# Patient Record
Sex: MALE | Race: WHITE | HISPANIC OR LATINO | URBAN - METROPOLITAN AREA
[De-identification: names, ages, dates, MRNs, and addresses within clinical notes are randomized per-mention and may not be internally consistent; named-entity substitution may affect disease eponyms.]

---

## 2020-03-13 ENCOUNTER — EMERGENCY (EMERGENCY)
Facility: HOSPITAL | Age: 50
LOS: 1 days | Discharge: HOME | End: 2020-03-13
Admitting: EMERGENCY MEDICINE
Payer: SELF-PAY

## 2020-03-13 VITALS
WEIGHT: 197.98 LBS | RESPIRATION RATE: 18 BRPM | DIASTOLIC BLOOD PRESSURE: 95 MMHG | OXYGEN SATURATION: 96 % | TEMPERATURE: 98 F | HEIGHT: 70 IN | SYSTOLIC BLOOD PRESSURE: 141 MMHG | HEART RATE: 85 BPM

## 2020-03-13 PROCEDURE — 99283 EMERGENCY DEPT VISIT LOW MDM: CPT

## 2020-03-13 RX ORDER — CYCLOBENZAPRINE HYDROCHLORIDE 10 MG/1
10 TABLET, FILM COATED ORAL ONCE
Refills: 0 | Status: COMPLETED | OUTPATIENT
Start: 2020-03-13 | End: 2020-03-13

## 2020-03-13 RX ORDER — IBUPROFEN 200 MG
600 TABLET ORAL ONCE
Refills: 0 | Status: COMPLETED | OUTPATIENT
Start: 2020-03-13 | End: 2020-03-13

## 2020-03-13 RX ORDER — CYCLOBENZAPRINE HYDROCHLORIDE 10 MG/1
1 TABLET, FILM COATED ORAL
Qty: 15 | Refills: 0
Start: 2020-03-13

## 2020-03-13 RX ADMIN — CYCLOBENZAPRINE HYDROCHLORIDE 10 MILLIGRAM(S): 10 TABLET, FILM COATED ORAL at 11:30

## 2020-03-13 RX ADMIN — Medication 600 MILLIGRAM(S): at 11:30

## 2020-03-13 RX ADMIN — Medication 600 MILLIGRAM(S): at 12:30

## 2020-03-13 NOTE — ED PROVIDER NOTE - PATIENT PORTAL LINK FT
You can access the FollowMyHealth Patient Portal offered by [facility] by registering at the following website: [email or web address]. By joining [facility]’s FollowMyHealth portal, you will also be able to view your health information using other applications (apps) compatible with our system.

## 2020-03-13 NOTE — ED PROVIDER NOTE - NSFOLLOWUPINSTRUCTIONS_ED_ALL_ED_FT
Take NSAIDs and tylenol for pain as well as flexeril as prescribed.  Follow up with primary doctor within one week. Return to ED for worsening pain, neck stiffness, numbness or weakness in arms, shortness of breath, chest pain    Muscle Strain  A muscle strain is an injury that happens when a muscle is stretched longer than normal. This can happen during a fall, sports, or lifting. This can tear some muscle fibers. Usually, recovery from muscle strain takes 1–2 weeks. Complete healing normally takes 5–6 weeks.  This condition is first treated with PRICE therapy. This involves:  Protecting your muscle from being injured again.Resting your injured muscle.Icing your injured muscle.Applying pressure (compression) to your injured muscle. This may be done with a splint or elastic bandage.Raising (elevating) your injured muscle.Your doctor may also recommend medicine for pain.  Follow these instructions at home:  If you have a splint:     Wear the splint as told by your doctor. Take it off only as told by your doctor.Loosen the splint if your fingers or toes tingle, get numb, or turn cold and blue.Keep the splint clean.If the splint is not waterproof:  Do not let it get wet.Cover it with a watertight covering when you take a bath or a shower.Managing pain, stiffness, and swelling        If directed, put ice on your injured area.  If you have a removable splint, take it off as told by your doctor.Put ice in a plastic bag.Place a towel between your skin and the bag.Leave the ice on for 20 minutes, 2–3 times a day.Move your fingers or toes often. This helps to avoid stiffness and lessen swelling.Raise your injured area above the level of your heart while you are sitting or lying down.Wear an elastic bandage as told by your doctor. Make sure it is not too tight.General instructions     Take over-the-counter and prescription medicines only as told by your doctor.Limit your activity. Rest your injured muscle as told by your doctor. Your doctor may say that gentle movements are okay.If physical therapy was prescribed, do exercises as told by your doctor.Do not put pressure on any part of the splint until it is fully hardened. This may take many hours.Do not use any products that contain nicotine or tobacco, such as cigarettes and e-cigarettes. These can delay bone healing. If you need help quitting, ask your doctor.Warm up before you exercise. This helps to prevent more muscle strains.Ask your doctor when it is safe to drive if you have a splint.Keep all follow-up visits as told by your doctor. This is important.Contact a doctor if:  You have more pain or swelling in your injured area.Get help right away if:  You have any of these problems in your injured area:  You have numbness.You have tingling.You lose a lot of strength.Summary  A muscle strain is an injury that happens when a muscle is stretched longer than normal.This condition is first treated with PRICE therapy. This includes protecting, resting, icing, adding pressure, and raising your injury.Limit your activity. Rest your injured muscle as told by your doctor. Your doctor may say that gentle movements are okay.Warm up before you exercise. This helps to prevent more muscle strains.This information is not intended to replace advice given to you by your health care provider. Make sure you discuss any questions you have with your health care provider.    Document Released: 09/26/2009 Document Revised: 01/24/2018 Document Reviewed: 01/24/2018  [employer] Interactive Patient Education © 2020 [employer]

## 2020-03-13 NOTE — ED PROVIDER NOTE - PHYSICAL EXAMINATION
Vitals reviewed  Gen: well appearing, nad, speaking in full sentences  Skin: wwp   HEENT: ncat, eomi, mmm  CV: rrr, no audible m/r/g  Resp: ctab, no w/r/r  Abd: soft/nt  Neck: no midline tenderness, tenderness over L SCM, tenderness over L scapula  Back: no midline back tenderness  Ext: FROM throughout, no peripheral edema, strength 5/5 in upper extremities, 2+ radiap pulses, peripheral and distal sensation intact.   Neuro: alert/oriented, no focal deficits, steady gait Vitals reviewed  Gen: well appearing, nad, speaking in full sentences  Skin: wwp   HEENT: ncat, eomi, mmm  CV: rrr, no audible m/r/g  Resp: ctab, no w/r/r  Neck: no midline tenderness, tenderness over L SCM, tenderness over L scapula  Back: no midline back tenderness  Ext: FROM throughout, no peripheral edema, strength 5/5 in upper extremities, 2+ radial pulses, peripheral and distal sensation intact.   Neuro: alert/oriented, no focal deficits, steady gait
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
## 2020-03-17 DIAGNOSIS — M54.2 CERVICALGIA: ICD-10-CM
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
## 2020-03-17 DIAGNOSIS — S16.1XXA STRAIN OF MUSCLE, FASCIA AND TENDON AT NECK LEVEL, INITIAL ENCOUNTER: ICD-10-CM
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
